# Patient Record
Sex: MALE | Race: BLACK OR AFRICAN AMERICAN | NOT HISPANIC OR LATINO | Employment: FULL TIME | URBAN - METROPOLITAN AREA
[De-identification: names, ages, dates, MRNs, and addresses within clinical notes are randomized per-mention and may not be internally consistent; named-entity substitution may affect disease eponyms.]

---

## 2025-04-04 ENCOUNTER — OFFICE VISIT (OUTPATIENT)
Dept: PRIMARY CARE | Facility: CLINIC | Age: 30
End: 2025-04-04
Payer: COMMERCIAL

## 2025-04-04 VITALS
HEART RATE: 83 BPM | HEIGHT: 70 IN | TEMPERATURE: 98 F | OXYGEN SATURATION: 99 % | DIASTOLIC BLOOD PRESSURE: 68 MMHG | WEIGHT: 163 LBS | SYSTOLIC BLOOD PRESSURE: 126 MMHG | BODY MASS INDEX: 23.34 KG/M2

## 2025-04-04 DIAGNOSIS — Z13.220 LIPID SCREENING: ICD-10-CM

## 2025-04-04 DIAGNOSIS — N63.41 SUBAREOLAR MASS OF RIGHT BREAST: Primary | ICD-10-CM

## 2025-04-04 PROCEDURE — 3008F BODY MASS INDEX DOCD: CPT | Performed by: STUDENT IN AN ORGANIZED HEALTH CARE EDUCATION/TRAINING PROGRAM

## 2025-04-04 PROCEDURE — 99203 OFFICE O/P NEW LOW 30 MIN: CPT | Performed by: STUDENT IN AN ORGANIZED HEALTH CARE EDUCATION/TRAINING PROGRAM

## 2025-04-04 RX ORDER — DOXYCYCLINE 100 MG/1
100 CAPSULE ORAL 2 TIMES DAILY
Qty: 10 CAPSULE | Refills: 0 | Status: SHIPPED | OUTPATIENT
Start: 2025-04-04 | End: 2025-04-09

## 2025-04-04 ASSESSMENT — PATIENT HEALTH QUESTIONNAIRE - PHQ9
2. FEELING DOWN, DEPRESSED OR HOPELESS: NOT AT ALL
1. LITTLE INTEREST OR PLEASURE IN DOING THINGS: NOT AT ALL
SUM OF ALL RESPONSES TO PHQ9 QUESTIONS 1 AND 2: 0

## 2025-04-04 ASSESSMENT — PAIN SCALES - GENERAL: PAINLEVEL_OUTOF10: 3

## 2025-04-04 NOTE — LETTER
April 4, 2025     Patient: Lavinia Morales   YOB: 1995   Date of Visit: 4/4/2025       To Whom It May Concern:    Lavinia Morales was seen in my clinic on 4/4/2025 at 2:00 pm. Please excuse Lavinia for his absence from work on this day to make the appointment.    If you have any questions or concerns, please don't hesitate to call.         Sincerely,         Artem Jara,         CC: No Recipients

## 2025-04-04 NOTE — PROGRESS NOTES
"Subjective   Patient ID: Lavinia Morales is a 30 y.o. male who presents for New Patient Visit and Mass (C/o lump on right side of chest x 5 days/Pt stated it started out small and has gotten bigger, started to become painful x 3 days. Hurts worse at work when moving his arm across his body (pain scale 8/10) and when he sleeps).    Here today to establish care.  No known past medical history.  Not sure about family history - no known family history of cancers.      Having swelling under right nipple x5 days.  Enlarging rapidly over time.  It is painful.  Has been intermittently red.  Has not noticed any discharge.         Review of Systems   Constitutional:  Negative for chills, fatigue and fever.   HENT:  Negative for congestion, hearing loss, rhinorrhea, sinus pressure, sinus pain and tinnitus.    Eyes:  Negative for visual disturbance.   Respiratory:  Negative for cough, shortness of breath and wheezing.    Cardiovascular:  Negative for chest pain, palpitations and leg swelling.   Gastrointestinal:  Negative for constipation, diarrhea, nausea and vomiting.   Endocrine: Negative for cold intolerance, heat intolerance, polydipsia and polyuria.   Genitourinary:  Negative for dysuria, frequency and urgency.   Musculoskeletal:  Negative for arthralgias and myalgias.   Skin:  Negative for pallor, rash and wound.   Neurological:  Negative for dizziness, light-headedness and headaches.   Psychiatric/Behavioral:  Negative for dysphoric mood and sleep disturbance. The patient is not nervous/anxious.        Objective     Visit Vitals  /68   Pulse 83   Temp 36.7 °C (98 °F)   Ht 1.778 m (5' 10\")   Wt 73.9 kg (163 lb)   SpO2 99%   BMI 23.39 kg/m²   Smoking Status Every Day   BSA 1.91 m²         Physical Exam  Constitutional:       Appearance: Normal appearance. He is normal weight.   HENT:      Head: Normocephalic and atraumatic.      Right Ear: External ear normal.      Left Ear: External ear normal.      Mouth/Throat:      " Mouth: Mucous membranes are moist.      Pharynx: Oropharynx is clear.   Eyes:      Conjunctiva/sclera: Conjunctivae normal.   Cardiovascular:      Rate and Rhythm: Normal rate and regular rhythm.      Pulses: Normal pulses.      Heart sounds: Normal heart sounds.   Pulmonary:      Effort: Pulmonary effort is normal.      Breath sounds: Normal breath sounds.   Chest:   Breasts:     Right: Mass (There is a exquisitely tender, well-circumscribed mass underlying the right nipple.) present.      Left: Normal.   Abdominal:      General: Abdomen is flat.      Palpations: Abdomen is soft.   Skin:     General: Skin is warm and dry.   Neurological:      Mental Status: He is alert and oriented to person, place, and time.   Psychiatric:         Mood and Affect: Mood normal.         Behavior: Behavior normal.         Assessment & Plan  Subareolar mass of right breast    Orders:    BI US breast limited right; Future    doxycycline (Vibramycin) 100 mg capsule; Take 1 capsule (100 mg) by mouth 2 times a day for 5 days. Take with at least 8 ounces (large glass) of water, do not lie down for 30 minutes after  Given intermittent redness and swelling, this could represent a developing abscess.  Will initiate treatment with antibiotics.  If no improvement, patient is to go for ultrasound.  Other differential considerations could include a cyst, gynecomastia, or much less likely a breast malignancy.  Lipid screening    Orders:    Lipid panel; Future    CBC; Future    Comprehensive metabolic panel; Future       Patient is to return to clinic at his convenience for an annual physical exam.  Reviewed and approved by CHRISTINE RED on 4/5/25 at 10:29 AM.

## 2025-04-05 ASSESSMENT — ENCOUNTER SYMPTOMS
NERVOUS/ANXIOUS: 0
POLYDIPSIA: 0
DIARRHEA: 0
DYSURIA: 0
SINUS PRESSURE: 0
MYALGIAS: 0
FATIGUE: 0
WOUND: 0
DIZZINESS: 0
DYSPHORIC MOOD: 0
WHEEZING: 0
SLEEP DISTURBANCE: 0
RHINORRHEA: 0
FEVER: 0
HEADACHES: 0
SINUS PAIN: 0
COUGH: 0
NAUSEA: 0
LIGHT-HEADEDNESS: 0
FREQUENCY: 0
VOMITING: 0
CONSTIPATION: 0
SHORTNESS OF BREATH: 0
ARTHRALGIAS: 0
PALPITATIONS: 0
CHILLS: 0